# Patient Record
Sex: FEMALE | Race: WHITE | NOT HISPANIC OR LATINO | URBAN - METROPOLITAN AREA
[De-identification: names, ages, dates, MRNs, and addresses within clinical notes are randomized per-mention and may not be internally consistent; named-entity substitution may affect disease eponyms.]

---

## 2021-04-07 ENCOUNTER — LAB OUTSIDE AN ENCOUNTER (OUTPATIENT)
Dept: URBAN - METROPOLITAN AREA CLINIC 92 | Facility: CLINIC | Age: 66
End: 2021-04-07

## 2021-04-07 ENCOUNTER — OFFICE VISIT (OUTPATIENT)
Dept: URBAN - METROPOLITAN AREA CLINIC 92 | Facility: CLINIC | Age: 66
End: 2021-04-07
Payer: MEDICARE

## 2021-04-07 DIAGNOSIS — R10.12 LUQ ABDOMINAL PAIN: ICD-10-CM

## 2021-04-07 DIAGNOSIS — R14.0 BLOATING: ICD-10-CM

## 2021-04-07 DIAGNOSIS — Z79.01 CHRONIC ANTICOAGULATION: ICD-10-CM

## 2021-04-07 DIAGNOSIS — K59.01 SLOW TRANSIT CONSTIPATION: ICD-10-CM

## 2021-04-07 PROBLEM — 42343007: Status: ACTIVE | Noted: 2021-04-06

## 2021-04-07 PROBLEM — 49436004: Status: ACTIVE | Noted: 2021-04-06

## 2021-04-07 PROBLEM — 711150003: Status: ACTIVE | Noted: 2021-04-07

## 2021-04-07 PROBLEM — 35298007: Status: ACTIVE | Noted: 2021-04-07

## 2021-04-07 PROBLEM — 64859006: Status: ACTIVE | Noted: 2021-04-06

## 2021-04-07 PROBLEM — 428283002: Status: ACTIVE | Noted: 2021-04-06

## 2021-04-07 PROBLEM — 711150003: Status: ACTIVE | Noted: 2021-04-06

## 2021-04-07 PROCEDURE — 99214 OFFICE O/P EST MOD 30 MIN: CPT | Performed by: INTERNAL MEDICINE

## 2021-04-07 RX ORDER — APIXABAN 5 MG/1
TABLET, FILM COATED ORAL
Qty: 0 | Refills: 0 | Status: ACTIVE | COMMUNITY
Start: 2016-10-15

## 2021-04-07 RX ORDER — CARVEDILOL 25 MG/1
TABLET, FILM COATED ORAL
Qty: 0 | Refills: 0 | Status: ACTIVE | COMMUNITY
Start: 2016-11-01

## 2021-04-07 RX ORDER — AMOXICILLIN AND CLAVULANATE POTASSIUM 500; 125 MG/1; 1/1
1 TABLET TABLET, FILM COATED ORAL
Qty: 21 | Refills: 1 | OUTPATIENT
End: 2021-04-21

## 2021-04-07 NOTE — HPI-TODAY'S VISIT:
The patient is a 65 year old /White female, who presents on referral from Ronaldo Caraballo MD, for a colonoscopy. A copy of this document will be sent to the referring provider. The patient had a previous colonoscopy approximately 3 years ago. It revealed Three 5 to 7mm, TA polyps in the cecum, three 6 to 8mm, TA polyps in the proximal ascending colon, two 7 to 10mm polyps in the sigmoid, two 5mm polyps at the recto-sigmoid, diverticulosis in the transverse colon, 3 yr recall The patient reports the risk factors for colon cancer of a history of colon polyps and a family history of colon polyps.  There is no recent history of rectal bleeding. The patient has no pertinent additional complaints of abdominal pain, constipation, diarrhea, and weight loss.     The patient has congestive heart failure since 2013, and atrial fibrillation which  monitors. Most recent EFx over 50% though 18% at dx. Not always in atrial fib.. She has been on Eliquis for about 1 year now. She was on Warfarin at the beginning. She states she has chronic constipation but is well controlled with tea allowing her to have a BM every 2 days. Right before a BM she just becomes bloated. The patient also reports she has osteoporosis. REcently started on Actonel. She thinks she is a little sensitive to gluten because she becomes less bloated and her allergies subside when she stays away from it. She also complains of mid upper abdominal and LUQ tenderness which causes little waves of queasiness.  10/16/19  The patient notes her heart failure is the same. Her ejection fraction is in the 40s. She is able to walk up a flight of stairs with no issues. She had a cardiac ablation on May 2018. Still in and out of atrial fibrillation. On Eliquis for a fib.   The patient notes she develops intermittent mid abdominal pain.   The patient notes she fell in the spring and fractured her right pelvis. She had to be out of work for 3 months. She does have osteoporosis. She does intake a sufficient amount of calcium. She was advised to consult with her PCP to discuss additional medication for osteoporosis beyond actonel.  Time spent with patient: 10 min  4/7/21  Patient reports she has been having 2 episodes LUQ pain underneath her ribs in February and in March lasting 4 to 5 days. In 2/21 her pain was a 7 or and 8.  It feels like something that has happened in the past. She wakes up with it. Walking, sitting or jarring movements worsens this. Admits to bloating. She does admit to constipation for which she takes herbal supplements which allows her to have a BM every other day. Having a BM does not affect the pain. The bloating would resolve once the pain resolved.  She took antibiotics amoxiciillin and advil from her dentist both times which she believes resolved the pain. Denies fever, chills, indigestion and heartburn.  She has not tried tums or omeprazole when she feels these symptoms Patient did go off eliOrad for both colonoscopies.  Patient has had both of her COVID-19 vaccines.  Time spent with patient: 18 min.

## 2021-04-09 ENCOUNTER — TELEPHONE ENCOUNTER (OUTPATIENT)
Dept: URBAN - METROPOLITAN AREA CLINIC 92 | Facility: CLINIC | Age: 66
End: 2021-04-09

## 2021-05-10 ENCOUNTER — TELEPHONE ENCOUNTER (OUTPATIENT)
Dept: URBAN - METROPOLITAN AREA CLINIC 92 | Facility: CLINIC | Age: 66
End: 2021-05-10

## 2021-05-10 ENCOUNTER — TELEPHONE ENCOUNTER (OUTPATIENT)
Dept: URBAN - METROPOLITAN AREA CLINIC 5 | Facility: CLINIC | Age: 66
End: 2021-05-10

## 2021-05-10 ENCOUNTER — LAB OUTSIDE AN ENCOUNTER (OUTPATIENT)
Dept: URBAN - METROPOLITAN AREA CLINIC 5 | Facility: CLINIC | Age: 66
End: 2021-05-10

## 2021-05-10 ENCOUNTER — OFFICE VISIT (OUTPATIENT)
Dept: URBAN - METROPOLITAN AREA SURGERY CENTER 16 | Facility: SURGERY CENTER | Age: 66
End: 2021-05-10
Payer: MEDICARE

## 2021-05-10 ENCOUNTER — DASHBOARD ENCOUNTERS (OUTPATIENT)
Age: 66
End: 2021-05-10

## 2021-05-10 DIAGNOSIS — Z12.11 COLON CANCER SCREENING: ICD-10-CM

## 2021-05-10 PROBLEM — 82934008: Status: ACTIVE | Noted: 2021-05-10

## 2021-05-10 PROCEDURE — 996 AG2 (NON BILLABLE): Performed by: INTERNAL MEDICINE

## 2021-05-10 RX ORDER — APIXABAN 5 MG/1
TABLET, FILM COATED ORAL
Qty: 0 | Refills: 0 | COMMUNITY
Start: 2016-10-15

## 2021-05-10 RX ORDER — CARVEDILOL 25 MG/1
TABLET, FILM COATED ORAL
Qty: 0 | Refills: 0 | COMMUNITY
Start: 2016-11-01

## 2021-05-10 RX ORDER — APIXABAN 5 MG/1
TABLET, FILM COATED ORAL
Qty: 0 | Refills: 0 | Status: ACTIVE | COMMUNITY
Start: 2016-10-15

## 2021-05-10 RX ORDER — CARVEDILOL 25 MG/1
TABLET, FILM COATED ORAL
Qty: 0 | Refills: 0 | Status: ACTIVE | COMMUNITY
Start: 2016-11-01

## 2021-05-10 NOTE — HPI-TODAY'S VISIT:
She is here for EGD but did not stop Elkiquis until yesterday. Not having any pain, not sure why having procedure.  CT shows 4x2 cm rounded area hypodense in several liver lobes, mri recommended, [facility]  took her to office and [place] will identify order. Pt will call . Also  c/o constipation, not clear if related to  pain. Will trial Linzess 72 w samples. EGD cancelled today.

## 2021-05-21 ENCOUNTER — TELEPHONE ENCOUNTER (OUTPATIENT)
Dept: URBAN - METROPOLITAN AREA CLINIC 98 | Facility: CLINIC | Age: 66
End: 2021-05-21

## 2021-05-21 ENCOUNTER — TELEPHONE ENCOUNTER (OUTPATIENT)
Dept: URBAN - METROPOLITAN AREA CLINIC 92 | Facility: CLINIC | Age: 66
End: 2021-05-21

## 2021-05-21 RX ORDER — CARVEDILOL 25 MG/1
TABLET, FILM COATED ORAL
Qty: 0 | Refills: 0 | COMMUNITY
Start: 2016-11-01

## 2021-05-21 RX ORDER — LINACLOTIDE 72 UG/1
1 CAPSULE AT LEAST 30 MINUTES BEFORE THE FIRST MEAL OF THE DAY ON AN EMPTY STOMACH CAPSULE, GELATIN COATED ORAL ONCE A DAY
Qty: 90 CAPSULE | Refills: 3 | OUTPATIENT

## 2021-05-21 RX ORDER — APIXABAN 5 MG/1
TABLET, FILM COATED ORAL
Qty: 0 | Refills: 0 | COMMUNITY
Start: 2016-10-15

## 2024-08-26 ENCOUNTER — OFFICE VISIT (OUTPATIENT)
Dept: URBAN - METROPOLITAN AREA CLINIC 25 | Facility: CLINIC | Age: 69
End: 2024-08-26

## 2024-08-26 ENCOUNTER — LAB OUTSIDE AN ENCOUNTER (OUTPATIENT)
Dept: URBAN - METROPOLITAN AREA CLINIC 25 | Facility: CLINIC | Age: 69
End: 2024-08-26

## 2024-08-26 VITALS
BODY MASS INDEX: 22.66 KG/M2 | SYSTOLIC BLOOD PRESSURE: 109 MMHG | TEMPERATURE: 97.6 F | DIASTOLIC BLOOD PRESSURE: 72 MMHG | WEIGHT: 136 LBS | HEIGHT: 65 IN | HEART RATE: 72 BPM

## 2024-08-26 PROBLEM — 710814002: Status: ACTIVE | Noted: 2024-08-26

## 2024-08-26 RX ORDER — PLECANATIDE 3 MG/1
1 TABLET TABLET ORAL ONCE A DAY
Qty: 90 TABLET | Refills: 1 | OUTPATIENT
Start: 2024-08-26 | End: 2025-02-21

## 2024-08-26 RX ORDER — CARVEDILOL 25 MG/1
TABLET, FILM COATED ORAL
Qty: 0 | Refills: 0 | Status: ACTIVE | COMMUNITY
Start: 2024-08-26

## 2024-09-11 ENCOUNTER — TELEPHONE ENCOUNTER (OUTPATIENT)
Dept: URBAN - METROPOLITAN AREA CLINIC 6 | Facility: CLINIC | Age: 69
End: 2024-09-11

## 2024-09-11 RX ORDER — LINACLOTIDE 290 UG/1
1 CAPSULE AT LEAST 30 MINUTES BEFORE THE FIRST MEAL OF THE DAY ON AN EMPTY STOMACH CAPSULE, GELATIN COATED ORAL ONCE A DAY
Qty: 90 | Refills: 1 | OUTPATIENT
Start: 2024-09-16 | End: 2025-03-15